# Patient Record
Sex: MALE | Race: WHITE | Employment: FULL TIME | URBAN - METROPOLITAN AREA
[De-identification: names, ages, dates, MRNs, and addresses within clinical notes are randomized per-mention and may not be internally consistent; named-entity substitution may affect disease eponyms.]

---

## 2023-02-15 ENCOUNTER — OFFICE VISIT (OUTPATIENT)
Dept: FAMILY MEDICINE | Facility: CLINIC | Age: 58
End: 2023-02-15

## 2023-02-15 VITALS
BODY MASS INDEX: 28 KG/M2 | TEMPERATURE: 98 F | SYSTOLIC BLOOD PRESSURE: 114 MMHG | HEART RATE: 50 BPM | DIASTOLIC BLOOD PRESSURE: 68 MMHG | HEIGHT: 71 IN | OXYGEN SATURATION: 99 % | RESPIRATION RATE: 20 BRPM | WEIGHT: 200 LBS

## 2023-02-15 DIAGNOSIS — Z71.89 ACP (ADVANCE CARE PLANNING): ICD-10-CM

## 2023-02-15 DIAGNOSIS — S46.011D TRAUMATIC COMPLETE TEAR OF RIGHT ROTATOR CUFF, SUBSEQUENT ENCOUNTER: ICD-10-CM

## 2023-02-15 DIAGNOSIS — Z01.818 PRE-OPERATIVE GENERAL PHYSICAL EXAMINATION: Primary | ICD-10-CM

## 2023-02-15 DIAGNOSIS — Z76.89 HEALTH CARE HOME: ICD-10-CM

## 2023-02-15 DIAGNOSIS — G47.33 OSA (OBSTRUCTIVE SLEEP APNEA): ICD-10-CM

## 2023-02-15 LAB
BUN SERPL-MCNC: 15 MG/DL (ref 7–25)
BUN/CREATININE RATIO: 15.8 (ref 6–22)
CALCIUM SERPL-MCNC: 9.5 MG/DL (ref 8.6–10.3)
CHLORIDE SERPLBLD-SCNC: 103.3 MMOL/L (ref 98–110)
CO2 SERPL-SCNC: 25.8 MMOL/L (ref 20–32)
CREAT SERPL-MCNC: 0.95 MG/DL (ref 0.6–1.3)
GLUCOSE SERPL-MCNC: 104 MG/DL (ref 60–99)
HEMOGLOBIN: 15 G/DL (ref 13.3–17.7)
POTASSIUM SERPL-SCNC: 4.47 MMOL/L (ref 3.5–5.3)
SODIUM SERPL-SCNC: 138.9 MMOL/L (ref 135–146)

## 2023-02-15 PROCEDURE — 85018 HEMOGLOBIN: CPT | Performed by: STUDENT IN AN ORGANIZED HEALTH CARE EDUCATION/TRAINING PROGRAM

## 2023-02-15 PROCEDURE — 80048 BASIC METABOLIC PNL TOTAL CA: CPT | Performed by: STUDENT IN AN ORGANIZED HEALTH CARE EDUCATION/TRAINING PROGRAM

## 2023-02-15 PROCEDURE — 99203 OFFICE O/P NEW LOW 30 MIN: CPT | Performed by: STUDENT IN AN ORGANIZED HEALTH CARE EDUCATION/TRAINING PROGRAM

## 2023-02-15 PROCEDURE — 36415 COLL VENOUS BLD VENIPUNCTURE: CPT | Performed by: STUDENT IN AN ORGANIZED HEALTH CARE EDUCATION/TRAINING PROGRAM

## 2023-02-15 NOTE — NURSING NOTE
Chief Complaint   Patient presents with     Pre-Op Exam     Right shoulder tendon repair being done by Dr. Couch with [facility], surgery being held at [facility] [place] on 2/17/23     New Patient     New patient to this clinic

## 2023-02-15 NOTE — LETTER
[facility]  [address]  [address]  [facility] [address]  [phone]      February 15, 2023      Trevon Martinez  [address]  [facility] [address]      EMERGENCY CARE PLAN  Presenting Problem Treatment Plan   Questions or concerns during clinic hours I will call the clinic directly:    [facility]  [address] , [address]  [place] [address]  [phone]   Questions or concerns outside clinic hours  I will call the 24 hour line at [phone]   Patient needs to schedule an appointment  I will call the  scheduling line at [phone]   Same day treatment   I will call the clinic first, then  urgent care and/or  express care if needed   Clinic Care Coordinators Marianne Francis RN:  [phone]  [facility] Clinical Support Staff: [phone]    Crisis Services:  Behavioral or Mental Health P ([facility])   [phone]   Emergency treatment--Immediately CALL [phone]

## 2023-02-15 NOTE — PROGRESS NOTES
[facility]  [address]  [address]  [facility] [address]  Phone: [phone]  Fax: [phone]  Primary Provider: Aracelis Galvan  Pre-op Performing Provider: ARACELIS GALVAN      PREOPERATIVE EVALUATION:  Today's date: 2/15/2023    Trevon Martinez is a 57 year old male who presents for a preoperative evaluation.    Surgical Information:  Surgery/Procedure: Right shoulder tendon repair   Surgery Location: [facility]   Surgeon: Dr. Couch   Surgery Date: 2/17/23  Time of Surgery: TBD  Where patient plans to recover: At home with family  Fax number for surgical facility: [phone]    Type of Anesthesia Anticipated: to be determined    Assessment & Plan     The proposed surgical procedure is considered INTERMEDIATE risk.      ICD-10-CM    1. Pre-operative general physical examination  Z01.818 Basic Metabolic Panel (BFP)     HEMOGLOBIN (BFP)      2. ACP (advance care planning)  Z71.89       3. Health Care Home  Z76.89       4. Traumatic complete tear of right rotator cuff, subsequent encounter  S46.011D       5. DELANEY (obstructive sleep apnea)  G47.33 Basic Metabolic Panel (BFP)     HEMOGLOBIN (BFP)      6. 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    Patient Instructions   Blood work today    Stop vitamins until after surgery    Call with any questions or concerns       Aracelis Galvan MD, [facility]         Subjective     HPI related to upcoming procedure: acute R RTC injury from fall 1 month ago. Otherwise in good health.     1. No - Have you ever had a  heart attack or stroke?  2. No - Have you ever had surgery on your heart or blood vessels, such as a stent, coronary (heart) bypass, or surgery on an artery in the head, neck, heart, or legs?  3. No - Do you have chest pain when you are physically active?  4. No - Do you have a history of heart failure?  5. No - Do you currently have a cold, bronchitis, or symptoms of other respiratory (head and chest) infections?  6. No - Do you have a cough, shortness of breath, or wheezing?  7. No - Do you or anyone in your family have a history of blood clots?  8. No - Do you or anyone in your family have a serious bleeding problem, such as long-lasting bleeding after surgeries or cuts?  9. No - Have you ever had anemia or been told to take iron pills?  10. No - Have you had any abnormal blood loss such as black, tarry or bloody stools, or abnormal vaginal bleeding?  11. No - Have you ever had a blood transfusion?  12. No - Are you willing to have a blood transfusion if it is medically needed before, during, or after your surgery?  Religious  13. No - Have you or anyone in your family ever had problems with anesthesia (sedation for surgery)?  14. Yes - Do you have sleep apnea, excessive snoring, or daytime drowsiness? Yes Do you have a CPAP machine? No, untreated  15. No - Do you have any artifical heart valves or other implanted medical devices, such as a pacemaker, defibrillator, or continuous glucose monitor?  16. No - Do you have any artifical joints?  17. No - Are you allergic to latex?    Health Care Directive:  Patient does not have a Health Care Directive or Living Will: Patient states has Advance Directive and will bring in a copy to clinic.      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[employer] 02/15/2023     Priority: Medium     DELANEY (obstructive sleep apnea) 12/29/2016     Priority: Medium      Past Medical History:   Diagnosis Date     DELANEY (obstructive sleep apnea)      Past Surgical History:   Procedure Laterality Date     EYE SURGERY      foreign body     KNEE SURGERY Right      Current Outpatient Medications   Medication Sig Dispense Refill     Multiple Vitamin (MULTIVITAMIN ADULT PO)          No Known Allergies     Social History     Tobacco Use     Smoking status: Never     Smokeless tobacco: Not on file   Substance Use Topics     Alcohol use: Yes     Comment: 2 to 3 beers per week     Family History   Problem Relation Age of Onset     Diabetes Mother      Prostate Cancer Father      Anesthesia Reaction No family hx of      Deep Vein Thrombosis (DVT) No family hx of      Cerebrovascular Disease No family hx of      Coronary Artery Disease No family hx of      History   Drug Use No         Objective     /68 (BP Location: Left arm, Patient Position: Sitting, Cuff Size: Adult Large)   Pulse 50   Temp 98  F (36.7  C) (Temporal)   Resp 20   Ht 1.803 m (5' 11\")   Wt 90.7 kg (200 lb)   SpO2 99%   BMI 27.89 kg/m      Physical Exam  GENERAL APPEARANCE: healthy, alert and no distress  EYES: Eyes grossly normal to inspection, PERRL and conjunctivae and sclerae normal  HENT: ear canals and TM's normal and nose and mouth without ulcers or lesions  NECK: no adenopathy, no asymmetry, masses, or scars and thyroid normal to palpation  RESP: lungs clear to auscultation - no rales, rhonchi or wheezes  CV: regular rate and rhythm, normal S1 S2, no S3 or S4 and no murmur, click or rub   ABDOMEN: soft, nontender, no HSM or masses and bowel sounds normal  NEURO: Normal strength and tone, sensory exam grossly normal, mentation intact and " speech normal  PSYCH: mentation appears normal and affect normal/bright      Diagnostics:  Results for orders placed or performed in visit on 02/15/23   Basic Metabolic Panel (BFP)     Status: Abnormal   Result Value Ref Range    Carbon Dioxide 25.8 20 - 32 mmol/L    Creatinine 0.95 0.60 - 1.30 mg/dL    Glucose 104 (A) 60 - 99 mg/dL    Sodium 138.9 135 - 146 mmol/L    Potassium 4.47 3.5 - 5.3 mmol/L    Chloride 103.3 98 - 110 mmol/L    Urea Nitrogen 15 7 - 25 mg/dL    Calcium 9.5 8.6 - 10.3 mg/dL    BUN/Creatinine Ratio 15.8 6 - 22   HEMOGLOBIN (BFP)     Status: None   Result Value Ref Range    Hemoglobin 15.0 13.3 - 17.7 g/dL       No EKG required, no history of coronary heart disease, significant arrhythmia, peripheral arterial disease or other structural heart disease.    Revised Cardiac Risk Index (RCRI):  The patient has the following serious cardiovascular risks for perioperative complications:   - No serious cardiac risks = 0 points     RCRI Interpretation: 0 points: Class I (very low risk - 0.4% complication rate)           Signed Electronically by: ARACELIS GALVAN MD  Copy of this evaluation report is provided to requesting physician.

## 2024-06-17 PROBLEM — Z76.89 HEALTH CARE HOME: Status: RESOLVED | Noted: 2023-02-15 | Resolved: 2024-06-17

## 2024-10-01 PROBLEM — Z53.1 PROCEDURE AND TREATMENT NOT CARRIED OUT BECAUSE OF PATIENT'S DECISION FOR REASONS OF BELIEF AND GROUP PRESSURE: Status: ACTIVE | Noted: 2023-02-15
